# Patient Record
Sex: FEMALE | Race: WHITE | Employment: OTHER | URBAN - METROPOLITAN AREA
[De-identification: names, ages, dates, MRNs, and addresses within clinical notes are randomized per-mention and may not be internally consistent; named-entity substitution may affect disease eponyms.]

---

## 2023-06-08 ENCOUNTER — EVALUATION (OUTPATIENT)
Dept: OCCUPATIONAL THERAPY | Age: 68
End: 2023-06-08

## 2023-06-08 DIAGNOSIS — M77.11 RIGHT LATERAL EPICONDYLITIS: Primary | ICD-10-CM

## 2023-06-08 NOTE — PROGRESS NOTES
and it did help. In January she began getting pain in her R forearm extensor muscle area. Pt went to the chiroprator he did ultrasound and heat modality - it felt  good for 2 days and then pain returned. Pt saw Dr Iliana Cortez on 5/16/2023 and he gave her a  cortisone shot. The shot did nothing to relieve the pain. She was fitted with a forearm compression band to wear during the day with activity . Was fitted with a wrsit cock up splint at night ( it may be just a wrap - to bring in next session). She states she does not move at night and feels she doesn't really need to wear it at night. Pt presents today for OT evaluation and treatment. 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 Pt has ^'ed pain when using the sweeper, opening jars, writing for long periods of time, with cutting veggies and fruit. Hobbies include - babysit grand kids 2- 3 days a week , use to do kickboxing. ADL STATUS:   Ind Mod I Min A Mod A Max A Dep Other   Feeding: x         Grooming: x         Bathing: x         UE Dressing: x         LE Dressing: x         Toileting: x         Transfers: x           Comments: Pt is using her R as her dominant for all functional tasks. Pain Level: Pain at rest : 4-5/10. Throbbing. Pain with light ADL tasks 2-3/10. Pain with resistive tasks 8-9/10 - not able  to open jars. Pt is using a lidicane roll on and lidicane patch ( on and off 12 hours) - pt has relief with it but cannot use it all day long. Pt doing icing occasionally. Tylenol for pain as needed.    Pain with palpation on entire extensor muscle belly and

## 2023-06-19 ENCOUNTER — TREATMENT (OUTPATIENT)
Dept: OCCUPATIONAL THERAPY | Age: 68
End: 2023-06-19
Payer: MEDICARE

## 2023-06-19 DIAGNOSIS — M77.11 RIGHT LATERAL EPICONDYLITIS: Primary | ICD-10-CM

## 2023-06-19 PROCEDURE — 97140 MANUAL THERAPY 1/> REGIONS: CPT | Performed by: OCCUPATIONAL THERAPIST

## 2023-06-19 PROCEDURE — 97530 THERAPEUTIC ACTIVITIES: CPT | Performed by: OCCUPATIONAL THERAPIST

## 2023-06-19 PROCEDURE — 97110 THERAPEUTIC EXERCISES: CPT | Performed by: OCCUPATIONAL THERAPIST

## 2023-06-19 NOTE — PROGRESS NOTES
OCCUPATIONAL THERAPY DAILY TREATMENT NOTE  [facility] S[facility] OT  [address] [address]  Dept: [phone]  [facility] [place] [facility] Fax: [phone]    Date:  2023  Initial Evaluation Date: 2023    Patient Name:  Chloe Latham    :  1955    Restrictions/Precautions:  none noted , low fall risk  Diagnosis:  R lateral epicondylitis; M77.11 - R lateral epicondylitis   Date of Surgery/Injury: onset 2023      Insurance/Certification information:  Medicare, [employer]  Plan of care signed (Y/N): N  Visit# / total visits:        Referring Practitioner:  Dr. Cristobal Young  Specific Practitioner Orders: Evaluate and treat as indicated, ADL instruction/ work simplification/ joint protection and edema reduction techniques. Fitted with wrist splint and counter force forearm band at the clinic     Assessment of current deficits    [x] IADLs         [x] Strength          [x] Pain       [x] Edema            OT PLAN OF CARE   OT POC based on physician orders, patient diagnosis and results of clinical assessment.      Frequency/Duration: 2x/wk for 12 visits  /  Certification Period From: 2023   To: 2023     Specific OT Treatment to include:   [x] Instruction in HEP                   Modalities:  [x] Therapeutic Exercise              [x] Ultrasound             [x] PROM/Stretching                    [x] Fluidotherapy          [x]  Paraffin                   [x] AAROM  [x] AROM                 [x] Iontophoresis:   [x] Tendon Glides                         [x] Electrical Stimulation/Attended                    [x] ADL/IADL re-training               [] Desensitization/Nerve Stimulation     [x] Therapeutic Activity                 [x] Pain Management with/without modalities PRN  [x] Manual Therapy                      [x] Scar Management                                                        [x] Manual Edema Mobilization    [x] Myofascial Release                   [x] GM/FM

## 2023-06-22 ENCOUNTER — TREATMENT (OUTPATIENT)
Dept: OCCUPATIONAL THERAPY | Age: 68
End: 2023-06-22

## 2023-06-22 DIAGNOSIS — M77.11 RIGHT LATERAL EPICONDYLITIS: Primary | ICD-10-CM

## 2023-06-22 NOTE — PROGRESS NOTES
OCCUPATIONAL THERAPY DAILY TREATMENT NOTE  [facility] S[facility] OT  [address] [facility] [address]  Dept: [phone]  [facility] BDM OT Fax: [phone]    Date:  2023  Initial Evaluation Date: 2023    Patient Name:  Anjana Rogers    :  1955    Restrictions/Precautions:  none noted , low fall risk  Diagnosis:  R lateral epicondylitis; M77.11 - R lateral epicondylitis   Date of Surgery/Injury: onset 2023      Insurance/Certification information:  Medicare, [employer]  Plan of care signed (Y/N): N  Visit# / total visits:        Referring Practitioner:  Dr. Jean Saenz  Specific Practitioner Orders: Evaluate and treat as indicated, ADL instruction/ work simplification/ joint protection and edema reduction techniques. Fitted with wrist splint and counter force forearm band at the clinic     Assessment of current deficits    [x] IADLs         [x] Strength          [x] Pain       [x] Edema            OT PLAN OF CARE   OT POC based on physician orders, patient diagnosis and results of clinical assessment.      Frequency/Duration: 2x/wk for 12 visits  /  Certification Period From: 2023   To: 2023     Specific OT Treatment to include:   [x] Instruction in HEP                   Modalities:  [x] Therapeutic Exercise              [x] Ultrasound             [x] PROM/Stretching                    [x] Fluidotherapy          [x]  Paraffin                   [x] AAROM  [x] AROM                 [x] Iontophoresis:   [x] Tendon Glides                         [x] Electrical Stimulation/Attended                    [x] ADL/IADL re-training               [] Desensitization/Nerve Stimulation     [x] Therapeutic Activity                 [x] Pain Management with/without modalities PRN  [x] Manual Therapy                      [x] Scar Management                                                        [x] Manual Edema Mobilization    [x] Myofascial Release                   [x] GM/FM

## 2023-06-26 ENCOUNTER — TREATMENT (OUTPATIENT)
Dept: OCCUPATIONAL THERAPY | Age: 68
End: 2023-06-26
Payer: MEDICARE

## 2023-06-26 DIAGNOSIS — M77.11 RIGHT LATERAL EPICONDYLITIS: Primary | ICD-10-CM

## 2023-06-26 PROCEDURE — 97140 MANUAL THERAPY 1/> REGIONS: CPT | Performed by: OCCUPATIONAL THERAPIST

## 2023-06-26 PROCEDURE — 97530 THERAPEUTIC ACTIVITIES: CPT | Performed by: OCCUPATIONAL THERAPIST

## 2023-06-26 PROCEDURE — 97110 THERAPEUTIC EXERCISES: CPT | Performed by: OCCUPATIONAL THERAPIST

## 2023-06-29 ENCOUNTER — TREATMENT (OUTPATIENT)
Dept: OCCUPATIONAL THERAPY | Age: 68
End: 2023-06-29

## 2023-06-29 DIAGNOSIS — M77.11 RIGHT LATERAL EPICONDYLITIS: Primary | ICD-10-CM
